# Patient Record
Sex: FEMALE | Race: BLACK OR AFRICAN AMERICAN | Employment: PART TIME | URBAN - METROPOLITAN AREA
[De-identification: names, ages, dates, MRNs, and addresses within clinical notes are randomized per-mention and may not be internally consistent; named-entity substitution may affect disease eponyms.]

---

## 2020-10-28 ENCOUNTER — HOSPITAL ENCOUNTER (EMERGENCY)
Facility: CLINIC | Age: 20
Discharge: HOME | End: 2020-10-28
Attending: EMERGENCY MEDICINE | Admitting: EMERGENCY MEDICINE
Payer: OTHER GOVERNMENT

## 2020-10-28 VITALS
DIASTOLIC BLOOD PRESSURE: 74 MMHG | RESPIRATION RATE: 18 BRPM | HEIGHT: 65 IN | TEMPERATURE: 98.2 F | HEART RATE: 74 BPM | OXYGEN SATURATION: 100 % | WEIGHT: 235 LBS | BODY MASS INDEX: 39.15 KG/M2 | SYSTOLIC BLOOD PRESSURE: 127 MMHG

## 2020-10-28 DIAGNOSIS — J02.9 PHARYNGITIS, UNSPECIFIED ETIOLOGY: ICD-10-CM

## 2020-10-28 LAB
DEPRECATED S PYO AG THROAT QL EIA: NEGATIVE
SPECIMEN SOURCE: NORMAL

## 2020-10-28 PROCEDURE — 99283 EMERGENCY DEPT VISIT LOW MDM: CPT

## 2020-10-28 PROCEDURE — 999N001174 HC STATISTIC STREP A RAPID: Performed by: EMERGENCY MEDICINE

## 2020-10-28 PROCEDURE — U0003 INFECTIOUS AGENT DETECTION BY NUCLEIC ACID (DNA OR RNA); SEVERE ACUTE RESPIRATORY SYNDROME CORONAVIRUS 2 (SARS-COV-2) (CORONAVIRUS DISEASE [COVID-19]), AMPLIFIED PROBE TECHNIQUE, MAKING USE OF HIGH THROUGHPUT TECHNOLOGIES AS DESCRIBED BY CMS-2020-01-R: HCPCS | Performed by: EMERGENCY MEDICINE

## 2020-10-28 PROCEDURE — C9803 HOPD COVID-19 SPEC COLLECT: HCPCS

## 2020-10-28 PROCEDURE — 250N000013 HC RX MED GY IP 250 OP 250 PS 637: Performed by: EMERGENCY MEDICINE

## 2020-10-28 PROCEDURE — 87651 STREP A DNA AMP PROBE: CPT | Performed by: EMERGENCY MEDICINE

## 2020-10-28 RX ORDER — IBUPROFEN 600 MG/1
600 TABLET, FILM COATED ORAL ONCE
Status: COMPLETED | OUTPATIENT
Start: 2020-10-28 | End: 2020-10-28

## 2020-10-28 RX ADMIN — IBUPROFEN 600 MG: 600 TABLET ORAL at 21:38

## 2020-10-28 ASSESSMENT — MIFFLIN-ST. JEOR: SCORE: 1836.83

## 2020-10-28 NOTE — LETTER
October 28, 2020      To Whom It May Concern:      Asael Fierro was seen in our Emergency Department today, 10/28/20.  I expect her condition to improve over the next 3 days.  She may return to work/school when improved.    Sincerely,      Dr Du

## 2020-10-28 NOTE — LETTER
Asael Fierro was seen and treated in our emergency department on 10/28/2020.  She may return to work on 11/1/2020.      If you have any questions or concerns, please don't hesitate to call.      Katja GASTELUM RN.

## 2020-10-28 NOTE — ED AVS SNAPSHOT
[facility] Emergency Dept  [address] [address]  Phone: [phone]  Fax: [phone]                                    Asael Fierro   MRN: [id number]    Department: [facility] Emergency Dept   Date of Visit: 10/28/2020           After Visit Summary Signature Page    I have received my discharge instructions, and my questions have been answered. I have discussed any challenges I see with this plan with the nurse or doctor.    ..........................................................................................................................................  Patient/Patient Representative Signature      ..........................................................................................................................................  Patient Representative Print Name and Relationship to Patient    ..................................................               ................................................  Date                                   Time    ..........................................................................................................................................  Reviewed by Signature/Title    ...................................................              ..............................................  Date                                               Time          22EPIC Rev 08/18

## 2020-10-28 NOTE — LETTER
October 28, 2020      To Whom It May Concern:      Asael Fierro was seen in our Emergency Department today, 10/28/20.  I expect her condition to improve over the next 3 days.  She may return to work when improved.    Sincerely,        Pam Khan RN

## 2020-10-29 LAB
SARS-COV-2 RNA SPEC QL NAA+PROBE: NOT DETECTED
SPECIMEN SOURCE: NORMAL
SPECIMEN SOURCE: NORMAL
STREP GROUP A PCR: NOT DETECTED

## 2020-10-29 NOTE — RESULT ENCOUNTER NOTE
Group A Streptococcus PCR is NEGATIVE   No treatment or change in treatment [facility] ED lab result protocol - Strep protocol.

## 2020-10-29 NOTE — ED PROVIDER NOTES
"History     Chief Complaint:  Pharyngitis     HPI  Asael Fierro is a 20 year old female who presents for evaluation of pharyngitis. The patient states that she had onset of pharyngitis, headache, constipation, and myalgia yesterday. She adds that she had sequential brown to red sputum that was coughed up yesterday. She took Advil yesterday, but this did not improve her symptoms.  No exposure to strep or Covid, but works as an online [employer]  and is exposed to people regularly. She denies any chance of pregnancy, nausea, or vomiting.  She adds that she presented to her primary for evaluation of heavy menorrhagia three weeks ago.      Allergies:  No Known Drug Allergies     Medications:   Medications reviewed. No pertinent medications.     Medical History:   Past medical history reviewed. No pertinent medical history.     Surgical History   Surgical history reviewed. No pertinent surgical history.     Family History:   Family history reviewed. No pertinent family history.     Social History:  Patient presents alone.   She notes that she lives alone.  Patient works at [employer].    Review of Systems  10 systems reviewed and negative except as above and in HPI.     Physical Exam     Patient Vitals for the past 24 hrs:   BP Temp Temp src Pulse SpO2 Height Weight   10/28/20 2011 135/82 98.2  F (36.8  C) Oral 61 96 % 1.651 m (5' 5\") 106.6 kg (235 lb)          Physical Exam  General: Alert, No distress. Nontoxic appearance  Head: No signs of trauma.   Mouth/Throat: Oropharynx somewhat erythematous. No purulent drainage.   Eyes: Conjunctivae are normal. Pupils are equal..   Neck: Normal range of motion.    CV: Appears well perfused.  Abd: Soft, nontender, no guarding or rebound  Resp:No respiratory distress.   MSK: Normal range of motion. No obvious deformity.   Neuro: The patient is alert and interactive. CORONADO. Speech normal. GCS 15  Skin: No lesion or sign of trauma noted.   Psych: normal mood and affect. " behavior is normal.      Emergency Department Course     Laboratory:  Laboratory findings were communicated with the patient who voiced understanding of the findings.    Rapid Strep Test: Negative  Strep Culture: Pending     COVID-19 Virus (Coronavirus), PCR NP Swab: pending       Interventions:   2138 Advil 600 mg PO     Emergency Department Course:    2114 Nursing notes and vitals reviewed.   I performed an exam of the patient as documented above.     2139 A throat and nares sample was obtained for laboratory testing as documented above.     2206 Findings and plan explained to the Patient. Patient discharged home with instructions regarding supportive care, medications, and reasons to return. The importance of close follow-up was reviewed.     Impression & Plan     Medical Decision Making:  Asael Fierro is a 20 year old female who presents for evaluation of a sore throat and clinical evidence of pharyngitis.  The rapid strep test is negative, and formal culture has been set up in the lab. There is no clinical evidence of peritonsillar abscess, retropharyngeal abscess, Lemierre's Syndrome, epiglottis, or Michael's angina. The etiology is most likely viral.   I have recommended treatment with analgesics and sart water gargles, and we will await formal culture results.  If the culture is positive, an ED physician will call the patient to initiate anti-microbial therapy. Covid swab was also obtained and patient informed that she should self quarantine according to national guidelines until her test is resulted.  Return if increasing pain, change in voice, neck pain, vomiting, fever, or shortness of breath. Follow-up with primary physician if not improving in 3-5 days. Given well appearance, I would not test further for other etiologies of serious bacterial infections.     Covid-19  Asael Fierro was evaluated during a global COVID-19 pandemic, which necessitated consideration that the patient might be at risk for  infection with the SARS-CoV-2 virus that causes COVID-19.   Applicable protocols for evaluation were followed during the patient's care.   COVID-19 was considered as part of the patient's evaluation. The plan for testing is:  A test was obtained during this visit.     Diagnosis:     ICD-10-CM    1. Pharyngitis, unspecified etiology  J02.9         Disposition:  Discharged to home.    Scribe Disclosure:  I, Ignacio Lam, am serving as a scribe at 9:24 PM on 10/28/2020 to document services personally performed by Martine Du MD based on my observations and the provider's statements to me.     Martine Arnold MD  10/29/20 0146